# Patient Record
Sex: MALE | Race: WHITE | NOT HISPANIC OR LATINO | Employment: OTHER | URBAN - METROPOLITAN AREA
[De-identification: names, ages, dates, MRNs, and addresses within clinical notes are randomized per-mention and may not be internally consistent; named-entity substitution may affect disease eponyms.]

---

## 2022-05-20 ENCOUNTER — OFFICE VISIT (OUTPATIENT)
Dept: NEUROSURGERY | Facility: CLINIC | Age: 72
End: 2022-05-20

## 2022-05-20 ENCOUNTER — TELEPHONE (OUTPATIENT)
Dept: NEUROSURGERY | Facility: CLINIC | Age: 72
End: 2022-05-20

## 2022-05-20 VITALS
HEIGHT: 72 IN | SYSTOLIC BLOOD PRESSURE: 120 MMHG | TEMPERATURE: 97.7 F | DIASTOLIC BLOOD PRESSURE: 62 MMHG | BODY MASS INDEX: 23.95 KG/M2 | WEIGHT: 176.8 LBS

## 2022-05-20 DIAGNOSIS — M51.36 DDD (DEGENERATIVE DISC DISEASE), LUMBAR: Primary | ICD-10-CM

## 2022-05-20 DIAGNOSIS — M54.50 CHRONIC LEFT-SIDED LOW BACK PAIN WITHOUT SCIATICA: ICD-10-CM

## 2022-05-20 DIAGNOSIS — G89.29 CHRONIC LEFT-SIDED LOW BACK PAIN WITHOUT SCIATICA: ICD-10-CM

## 2022-05-20 PROBLEM — M51.369 DDD (DEGENERATIVE DISC DISEASE), LUMBAR: Status: ACTIVE | Noted: 2022-05-20

## 2022-05-20 PROCEDURE — 99204 OFFICE O/P NEW MOD 45 MIN: CPT | Performed by: PHYSICIAN ASSISTANT

## 2022-05-20 RX ORDER — CHLORZOXAZONE 500 MG/1
TABLET ORAL
COMMUNITY
Start: 2022-02-16

## 2022-05-20 RX ORDER — TRAMADOL HYDROCHLORIDE 50 MG/1
50 TABLET ORAL AS NEEDED
COMMUNITY

## 2022-05-20 RX ORDER — NAPROXEN SODIUM 220 MG
220 TABLET ORAL 2 TIMES DAILY PRN
COMMUNITY

## 2022-05-20 NOTE — PROGRESS NOTES
Patient: Guanako Trammell  : 1950  Chart #: [id number]    Date of Service: 2022    Chief Complaint   Patient presents with   • Back Pain     Low back pain new patient         HPI  71-year-old white male presents with left-sided low back pain since 2020, he researched exercises and stretches and his pain improved.  In December he had a flareup with left lower back pain, this occurred after he bent over, he felt a ripping pain in his left lower back and posterior hip.  Again exercises and stretches along with yoga has improved his pain.  today he is not having any pain.  He does have an MRI from 2022 for review today.    Chronic Illnesses:  Osteoarthritis  Facet arthropathy  Degenerative disc disease  Past Medical History:   Diagnosis Date   • Asthma    • Low back pain        Past Surgical History:   Procedure Laterality Date   • CARPAL TUNNEL RELEASE     • CLAVICULAR LIGAMENT RECONSTRUCTION     • CYST REMOVAL  2002    ganglion   • ROTATOR CUFF REPAIR  2016   • ROTATOR CUFF REPAIR Right        No Known Allergies      Current Outpatient Medications:   •  chlorzoxazone (PARAFON FORTE) 500 MG tablet, , Disp: , Rfl:   •  naproxen sodium (ALEVE) 220 MG tablet, Take 220 mg by mouth 2 (Two) Times a Day As Needed., Disp: , Rfl:   •  traMADol (ULTRAM) 50 MG tablet, Take 50 mg by mouth As Needed for Moderate Pain ., Disp: , Rfl:     Social History     Socioeconomic History   • Marital status:    Tobacco Use   • Smokeless tobacco: Never Used   Substance and Sexual Activity   • Alcohol use: Yes     Alcohol/week: 1.0 standard drink     Types: 1 Glasses of wine per week     Comment: per week   • Drug use: Never   • Sexual activity: Defer       Family History   Problem Relation Age of Onset   • Cancer Father    • Asthma Father    • Cancer Brother        BMI is within normal parameters. No other follow-up for BMI required.       Social History    Tobacco Use      Smoking status: Not on file       "Smokeless tobacco: Never Used       Physical examination:  Blood pressure 120/62, temperature 97.7 °F (36.5 °C), height 182.9 cm (72\"), weight 80.2 kg (176 lb 12.8 oz).  HEENT- normocephalic, atraumatic, sclera clear  Lungs-normal expansion, no wheezing  Heart-regular rate and rhythm  Extremities-positive pulses, no edema    Neurologic Exam  WDWN WM  A/A/C, speech clear, attention normal, conversant, answers questions appropriately, good historian.  Cranial nerves II through XII are intact.  Motor examination does not reveal weakness in the , upper or lower extremities.   Sensation is intact.  Gait is normal, balance is normal.   No tremors are noted.  Palpation of the lumbar paraspinal musculature and posterior hips are negative.    Radiographic Imaging:  For my review  Is an MRI of the lumbar spine from February 28, 2022 which shows multilevel degenerative disc disease throughout the lumbar spine.  There is collapse of the disc spaces at L2-3 and L3-4 with bulging disc, prior compression fracture of T12.  At L 2-3 there is broad-based disc bulging with facet arthropathy and left foraminal narrowing.  At L2-3 there is mild broad-based disc bulging, mild thickening of the ligamentum flavum and mild to moderate bilateral foraminal narrowing without nerve root compression.    Medical Decision Making  Diagnoses and all orders for this visit:    1. DDD (degenerative disc disease), lumbar (Primary)    2. Chronic left-sided low back pain without sciatica       I would continue conservative treatment with stretches and yoga.  If the patient were to have a flareup physical therapy and/or referral to pain management for a L2-3 left facet injection would be appropriate.  It was a pleasure providing neurosurgical evaluation.       Cate Rivera, PAC    Patient Care Team:  Vince Guerrero MD as PCP - General (Family Medicine)  Vince Guerrero MD as Referring Physician (Family Medicine)        "

## 2023-02-08 ENCOUNTER — TELEPHONE (OUTPATIENT)
Dept: NEUROSURGERY | Facility: CLINIC | Age: 73
End: 2023-02-08
Payer: MEDICARE

## 2023-02-08 NOTE — TELEPHONE ENCOUNTER
Patient called to speak with Miguel LISA. He was last seen in May 2022. He has continued to have low back pain. He would like to discuss treatment options - i.e. referral to pain management?     He did try to get in to see ortho, but they declined, telling him he needs to see neurosurgery for his back issues/symptoms.    His callback number is [phone].

## 2025-01-25 PROCEDURE — 87086 URINE CULTURE/COLONY COUNT: CPT | Performed by: NURSE PRACTITIONER

## 2025-02-05 ENCOUNTER — TELEPHONE (OUTPATIENT)
Dept: URGENT CARE | Facility: CLINIC | Age: 75
End: 2025-02-05
Payer: MEDICARE